# Patient Record
Sex: MALE | Race: OTHER | HISPANIC OR LATINO | URBAN - METROPOLITAN AREA
[De-identification: names, ages, dates, MRNs, and addresses within clinical notes are randomized per-mention and may not be internally consistent; named-entity substitution may affect disease eponyms.]

---

## 2020-06-22 ENCOUNTER — EMERGENCY (EMERGENCY)
Facility: HOSPITAL | Age: 60
LOS: 1 days | Discharge: HOME | End: 2020-06-22
Attending: EMERGENCY MEDICINE
Payer: MEDICAID

## 2020-06-22 VITALS
DIASTOLIC BLOOD PRESSURE: 76 MMHG | TEMPERATURE: 98 F | RESPIRATION RATE: 18 BRPM | WEIGHT: 160.06 LBS | HEART RATE: 71 BPM | SYSTOLIC BLOOD PRESSURE: 170 MMHG | HEIGHT: 67 IN | OXYGEN SATURATION: 99 %

## 2020-06-22 VITALS
OXYGEN SATURATION: 100 % | TEMPERATURE: 98 F | DIASTOLIC BLOOD PRESSURE: 99 MMHG | RESPIRATION RATE: 18 BRPM | SYSTOLIC BLOOD PRESSURE: 179 MMHG | HEART RATE: 64 BPM

## 2020-06-22 PROCEDURE — 11740 EVACUATION SUBUNGUAL HMTMA: CPT | Mod: RT

## 2020-06-22 PROCEDURE — 99283 EMERGENCY DEPT VISIT LOW MDM: CPT | Mod: 25

## 2020-06-22 PROCEDURE — 73140 X-RAY EXAM OF FINGER(S): CPT

## 2020-06-22 PROCEDURE — 11740 EVACUATION SUBUNGUAL HMTMA: CPT | Mod: F5

## 2020-06-22 PROCEDURE — 73140 X-RAY EXAM OF FINGER(S): CPT | Mod: 26,RT

## 2020-06-22 NOTE — ED PROVIDER NOTE - PHYSICAL EXAMINATION
General: NAD, good hygiene, well developed  HENT: Atraumatic, EOMI, no conjunctivae injection, moist mucosa   Cardiovascular: RRR, S1&2, no M or R, radial pulses equal and b/l  Respiratory: CTABL, no wheezes or crackles, no decreased breath sounds  Abdominal: soft and non-tender non distended, neg for guarding   Finger: right first distal phalanges, blackish discoloration involving the whole nail and the germinal and lunula, FROM of all fingers, PIP and DIP movement intact,    Neurologic: nonfocal, AAOx3  Psych: normal mood and affect

## 2020-06-22 NOTE — ED PROVIDER NOTE - NSFOLLOWUPINSTRUCTIONS_ED_ALL_ED_FT
Annia por visitar nuestro Departamento de emergencias, ha sido un placer participar en hall atención médica. Willy un seguimiento con hall médico de atención primaria dentro de las 48 horas.    Hall diagnóstico de liborio es: hematoma subungueal  Mantenga la herida limpia y seca. Regresa si el dolor empeora  tome medicamentos de venta tatiana para el dolor, julia motrin (600 mg cada 6 horas) y tylenol (650 mg cada 4 horas) para el dolor y consulte con hall médico de atención primaria.    Las precauciones de regreso al departamento de emergencias incluyen, entre otras: náuseas, vómitos, fiebre, escalofríos, dolor en el pecho, dificultad para respirar, mareos, dolor abdominal, dolor que empeora, síncope, jennifer en la orina o heces, dolor de lottie que no se limita resolución, entumecimiento u hormigueo, pérdida de la sensibilidad, pérdida de la función motora o cualquier otro síntoma relacionado.
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
## 2020-06-22 NOTE — ED PROVIDER NOTE - PATIENT PORTAL LINK FT
You can access the FollowMyHealth Patient Portal offered by [facility] by registering at the following website: [email or web address]. By joining [employer]’s FollowMyHealth portal, you will also be able to view your health information using other applications (apps) compatible with our system.

## 2020-06-22 NOTE — ED PROCEDURE NOTE - GENERAL PROCEDURE DETAILS
2cc of lidocaine injected at the base MCJ, and cautery at the middle of the first right nail bed. Dark bleed with drained out and finger is wrapped in gauze.
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
## 2020-06-22 NOTE — ED PROVIDER NOTE - CARE PLAN
Principal Discharge DX:	Subungual hematoma of fingernail, initial encounter Principal Discharge DX:	Subungual hematoma of fingernail, initial encounter  Goal:	R 1st (thumb) finger

## 2020-06-22 NOTE — ED ADULT NURSE NOTE - NSIMPLEMENTINTERV_GEN_ALL_ED
Implemented All Universal Safety Interventions:  [place] to call system. Call bell, personal items and telephone within reach. Instruct patient to call for assistance. Room bathroom lighting operational. Non-slip footwear when patient is off stretcher. Physically safe environment: no spills, clutter or unnecessary equipment. Stretcher in lowest position, wheels locked, appropriate side rails in place.

## 2020-06-22 NOTE — ED PROCEDURE NOTE - ATTENDING CONTRIBUTION TO CARE
as above, nvi w/ bcr distally pre drainage, no complication, no superimposed infectious changes -- Deion Simmons MD
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
## 2022-09-06 NOTE — ED ADULT NURSE NOTE - NS ED NURSE RECORD ANOTHER HT AND WT
Yes Cimzia Counseling:  I discussed with the patient the risks of Cimzia including but not limited to immunosuppression, allergic reactions and infections.  The patient understands that monitoring is required including a PPD at baseline and must alert us or the primary physician if symptoms of infection or other concerning signs are noted.

## 2023-06-11 NOTE — ED PROVIDER NOTE - CLINICAL SUMMARY MEDICAL DECISION MAKING FREE TEXT BOX
From: Jyoti Tomlinson  To: Yaneth Calderon MD  Sent: 6/10/2023 4:49 PM CDT  Subject: Bone Spur Right Shoulder    Is there an Ortho you can recommend? Thanks! see MD note

## 2024-05-24 NOTE — ED ADULT NURSE NOTE - NSFALLRSKPASTHIST_ED_ALL_ED
[facility] sent Rx request for the following:      Requested Prescriptions   Pending Prescriptions Disp Refills    levothyroxine (SYNTHROID/LEVOTHROID) 100 MCG tablet [Pharmacy Med Name: LEVOTHYROXINE 100MCG TABLET] 90 tablet 3     Sig: TAKE 1 TABLET (100 MCG) BY MOUTH DAILY       Thyroid Protocol Failed - 5/24/2024 10:55 AM        Failed - Normal TSH on file in past 12 months     Recent Labs   Lab Test 06/23/23  0748   TSH 4.69*             Last Prescription Date:   6/26/23  Last Fill Qty/Refills:         90, R-3    Last Office Visit:              6/26/23   Future Office visit:             Next 5 appointments (look out 90 days)      Aug 05, 2024  8:20 AM  (Arrive by 8:05 AM)  PHYSICAL with Sheldon Batista MD  [facility] ([facility] ) [address]  [place] [address]  [phone]           Routing refill request to provider for review/approval because:  Labs out of range:  TSH  Jaqueline Brewer RN on 5/24/2024 at 10:58 AM            
no